# Patient Record
Sex: FEMALE
[De-identification: names, ages, dates, MRNs, and addresses within clinical notes are randomized per-mention and may not be internally consistent; named-entity substitution may affect disease eponyms.]

---

## 2017-09-22 ENCOUNTER — HOSPITAL ENCOUNTER (OUTPATIENT)
Dept: HOSPITAL 80 - FSGY | Age: 77
Discharge: HOME | End: 2017-09-22
Attending: INTERNAL MEDICINE
Payer: COMMERCIAL

## 2017-09-22 VITALS — TEMPERATURE: 98.1 F

## 2017-09-22 VITALS — OXYGEN SATURATION: 96 %

## 2017-09-22 VITALS — RESPIRATION RATE: 14 BRPM | DIASTOLIC BLOOD PRESSURE: 84 MMHG | HEART RATE: 77 BPM | SYSTOLIC BLOOD PRESSURE: 140 MMHG

## 2017-09-22 DIAGNOSIS — Z88.2: ICD-10-CM

## 2017-09-22 DIAGNOSIS — M35.2: ICD-10-CM

## 2017-09-22 DIAGNOSIS — K29.70: ICD-10-CM

## 2017-09-22 DIAGNOSIS — R13.10: Primary | ICD-10-CM

## 2017-09-22 DIAGNOSIS — G25.81: ICD-10-CM

## 2017-09-22 DIAGNOSIS — F32.9: ICD-10-CM

## 2017-09-22 DIAGNOSIS — E78.5: ICD-10-CM

## 2017-09-22 DIAGNOSIS — E11.9: ICD-10-CM

## 2017-09-22 DIAGNOSIS — J44.9: ICD-10-CM

## 2017-09-22 DIAGNOSIS — K21.9: ICD-10-CM

## 2017-09-22 DIAGNOSIS — F41.9: ICD-10-CM

## 2017-09-22 PROCEDURE — C1726 CATH, BAL DIL, NON-VASCULAR: HCPCS

## 2017-09-22 PROCEDURE — 43239 EGD BIOPSY SINGLE/MULTIPLE: CPT

## 2017-09-22 PROCEDURE — 43249 ESOPH EGD DILATION <30 MM: CPT

## 2017-09-22 PROCEDURE — 43248 EGD GUIDE WIRE INSERTION: CPT

## 2017-09-22 PROCEDURE — 93005 ELECTROCARDIOGRAM TRACING: CPT

## 2017-09-22 NOTE — PDANEPAE
ANE Past Medical History





- Cardiovascular History


Hx Hypertension: Yes


Hx Arrhythmias: Yes


Hx Chest Pain: No


Hx Coronary Artery / Peripheral Vascular Disease: Yes


Hx CHF / Valvular Disease: Yes


Hx Palpitations: Yes


Cardiovascular History Comment: HYPERLIPIDEMIA





- Pulmonary History


Hx COPD: Yes


Hx Asthma/Reactive Airway Disease: Yes


Hx Recent Upper Respiratory Infection: No


Hx Oxygen in Use at Home: Yes


O2 in Use at Home (L/minute): O2 2L CONT.


Hx Sleep Apnea: No


Sleep Apnea Screening Result - Last Documented: Negative





- Neurologic History


Hx Cerebrovascular Accident: Yes


Neurologic History Comment: RESTLESS LEG SYNDROME





- Endocrine History


Hx Diabetes: Yes


Endocrine History Comment: DM II





- Neurological & Psychiatric Hx


Hx Neurological and Psychiatric Disorders: Yes


Neurological / Psychiatric History Comment: DEPRESSIVE DISORDER.  ANXIETY





- GI History


Hx Gastrointestinal Disorders: Yes


Gastrointestinal History Comment: GERD.  CONSTIPATION





- Other Health History


Other Health History: BEHCET'S DISEASE.  OSTEOARTHRITIS.  GLOSSITIS





ANE Review of Systems


Review of Systems: 








- Exercise capacity


METS (RN): 3 METS





ANE Patient History





- Allergies


Allergies/Adverse Reactions: 








adhesive tape Allergy (Verified 09/21/17 18:40)


 


Sulfa (Sulfonamide Antibiotics) Allergy (Verified 09/21/17 18:39)


 


warfarin [From Coumadin] Allergy (Verified 09/21/17 18:39)


 








- Home Medications


Home Medications: 








Aspirin  09/21/17 [Last Taken Unknown]


Ativan  09/21/17 [Last Taken Unknown]


Celexa  09/21/17 [Last Taken Unknown]


Colchicine  09/21/17 [Last Taken Unknown]


Flonase Nasal Spray  09/21/17 [Last Taken Unknown]


Herbals/Supplements -Info Only  09/21/17 [Last Taken Unknown]


Lipitor  09/21/17 [Last Taken Unknown]


Metformin HCl  09/21/17 [Last Taken Unknown]


Mucinex  09/21/17 [Last Taken Unknown]


Norco 5/325 (*)  09/21/17 [Last Taken Unknown]


Nystatin  09/21/17 [Last Taken Unknown]


Pantoprazole Sodium  09/21/17 [Last Taken Unknown]


Requip  09/21/17 [Last Taken Unknown]


Senna  09/21/17 [Last Taken Unknown]


Spiriva Inhaler (RX)  09/21/17 [Last Taken Unknown]


Symbicort 80-4.5 Mcg Inhaler  09/21/17 [Last Taken Unknown]


Triamcinolone 0.025%  09/21/17 [Last Taken Unknown]








- NPO status


NPO Since - Liquids (Date): 09/21/17


NPO Since - Liquids (Time): 19:30


NPO Since - Solids (Date): 09/21/17


NPO Since - Solids (Time): 19:30





- Smoking Hx


Smoking Status: Never smoked





ANE Labs/Vital Signs





- Vital Signs


Blood Pressure: 144/89


Heart Rate: 88


Respiratory Rate: 16


O2 Sat (%): 96


Height: 167.64 cm


Weight: 79.832 kg





ANE Physical Exam





- Airway


Neck exam: FROM


Mallampati Score: Class 2


Mouth exam: dentures





- Pulmonary


Pulmonary: reduced air movement, expiratory wheeze, other (chronic respiratory 

insufficency, end stage COPD)





- Cardiovascular


Cardiovascular: regular rate and rhythym, no murmur, rub, or gallop





- ASA Status


ASA Status: IV





ANE Anesthesia Plan


Anesthesia Plan: MAC

## 2017-09-22 NOTE — CPEKG
Heart Rate: 84

RR Interval: 714

P-R Interval: 116

QRSD Interval: 78

QT Interval: 392

QTC Interval: 464

P Axis: -3

QRS Axis: -65

T Wave Axis: 46

EKG Severity - ABNORMAL ECG -

EKG Impression: SINUS RHYTHM

EKG Impression: LEFT ANTERIOR FASCICULAR BLOCK

EKG Impression: CONSIDER RVH W/ SECONDARY REPOL ABNORMALITY

EKG Impression: ANTERIOR T WAVE ABNORMALITIES - CANNOT RULE OUT ISCHEMIA

Electronically Signed By: Solange Rodrigues 22-Sep-2017 11:54:13

## 2017-09-22 NOTE — PDGENHP
History & Physical


Chief Complaint: DYSPHAGIA


History of Present Illness: DYSPHAGIA


Pertinent Past, Social, Family History: no tobacco or alcohol.  fhx no colon 

cance.  COPD need for oxygen


Relevant Physical Exam: a+ox3.  cta.  s1s2.  +Bs, soft nt


Cardiorespiratory Assessment: class 4

## 2017-09-22 NOTE — POSTANESTH
Post Anesthetic Evaluation


Cardiovascular Status: Normal, Stable, Similar to Pre-Op Cond


Respiratory Status: Normal, Stable, Similar to Pre-op Cond.


Level of Consciousness/Mental Status: Can Participate in Eval


Pain Control: Adequate, Prn Tx Ordered


Nausea/Vomiting Control: Adequate, Prn Tx Ordered


Complications Possibly Related to Anesthesia: None Noted

## 2017-09-22 NOTE — GIREPORT
[facility]

Surgical Services - Endoscopy Department

_____________________________________________________________________________________________________
_______

Patient Name: Brinda Bradley                          Procedure Date: 9/22/2017 9:36 AM

MRN: [id number]                                       Account Number: [id number]

Patient Type: Outpatient                             Attending  MD/ ER Physician: STEPHANIE Mart

_____________________________________________________________________________________________________
_______

 

Procedure:                    Upper GI endoscopy

Indications:                  Dysphagia

Providers:                    Tha Gusman MD

Medicines:                    Sedation Required Anesthesia Staff Assistance, IV general

Complications:                No immediate complications. Estimated blood loss: Minimal.

_____________________________________________________________________________________________________
_______

Description of Procedure:     After obtaining informed consent, the endoscope was passed under direct
 

                              vision. Throughout the procedure, the patient's blood pressure, pulse, 
and 

                              oxygen saturations were monitored continuously. The Endoscope was intro
duced 

                              through the mouth, and advanced to the second part of duodenum. The [facility]
er GI 

                              endoscopy was accomplished without difficulty. The patient tolerated th
e 

                              procedure well.

                                                                                                     
       

Findings:

     The gastroesophageal junction was normal. A TTS dilator was passed through the scope. Dilation w
ith 

     an 18-19-20 mm balloon dilator was performed to 20 mm. The dilation site was examined and showed
 no 

     change. Estimated blood loss: none.

     The examined esophagus was normal. A guidewire was placed and the scope was withdrawn. Dilation 
was 

     performed with a Savary dilator with no resistance at 60 Fr. Estimated blood loss: none.

     Scattered moderate inflammation characterized by erythema and granularity was found in the gastr
ic 

     antrum. Biopsies were taken with a cold forceps for histology. Estimated blood loss was minimal.


     The examined duodenum was normal.

     The exam was otherwise without abnormality.

                                                                                                     
       

Estimated Blood Loss:

     Estimated blood loss was minimal.

                                                                                                     
       

Post Op Diagnosis:

     - Normal gastroesophageal junction. Dilated. No visible effect. Therefore passed the 60 Kh[place] 

     Savary, also with no visible effect.

     - Normal esophagus. Dilated.

     - Gastritis. Biopsied.

     - Normal examined duodenum.

     - The examination was otherwise normal.

                                                                                                     
       

Recommendation:

     - Await pathology results.

     - My office will call with the pathology result with 5-7 days. If you have not heard from my off
ice 

     by 12-14, do not assume the pathology is normal, please call [phone] to get the pathology r
eults.

     - Follow an antireflux regimen.

     - Use Protonix (pantoprazole) 40 mg PO daily. Take 30-60 minutes before breakfast

     - If dysphagia conitnues consider an ambulatory esophageal manometry at appointment to be schedu
led.

     - Discharge patient to home (ambulatory).

     - Return to GI clinic PRN.

     - Return to primary care physician as previously scheduled.

     - Thank you for allowing me to help in your patient's care. Do not hesitate to call with any 

     questions.

                                                                                                     
       

Attending Participation:

     I personally performed the entire procedure.

                                                                                                     
       

 

Uzma Almazan M.D

___________________

Tha Gusman MD

9/22/2017 10:35:45 AM

Number of Addenda: 0

 

Note Initiated On: 9/22/2017 9:36 AM

Total Procedure Duration Time 0 hours 9 minutes 54 seconds 

[email or web address]

## 2018-05-03 ENCOUNTER — HOSPITAL ENCOUNTER (OUTPATIENT)
Dept: HOSPITAL 80 - FIMAGING | Age: 78
End: 2018-05-03
Attending: GENERAL ACUTE CARE HOSPITAL
Payer: COMMERCIAL

## 2018-05-03 DIAGNOSIS — R91.1: Primary | ICD-10-CM

## 2018-05-03 DIAGNOSIS — K44.9: ICD-10-CM

## 2018-05-03 PROCEDURE — 74160 CT ABDOMEN W/CONTRAST: CPT

## 2019-03-15 ENCOUNTER — HOSPITAL ENCOUNTER (OUTPATIENT)
Dept: HOSPITAL 80 - FSGY | Age: 79
Discharge: HOME | End: 2019-03-15
Attending: INTERNAL MEDICINE
Payer: COMMERCIAL

## 2019-03-15 VITALS — DIASTOLIC BLOOD PRESSURE: 71 MMHG | SYSTOLIC BLOOD PRESSURE: 121 MMHG

## 2019-03-15 DIAGNOSIS — K44.9: ICD-10-CM

## 2019-03-15 DIAGNOSIS — E78.5: ICD-10-CM

## 2019-03-15 DIAGNOSIS — R13.10: Primary | ICD-10-CM

## 2019-03-15 DIAGNOSIS — K21.9: ICD-10-CM

## 2019-03-15 DIAGNOSIS — E11.9: ICD-10-CM

## 2019-03-15 DIAGNOSIS — F41.9: ICD-10-CM

## 2019-03-15 DIAGNOSIS — F32.9: ICD-10-CM

## 2019-03-15 DIAGNOSIS — G25.81: ICD-10-CM

## 2019-03-15 NOTE — POSTANESTH
Post Anesthetic Evaluation


Cardiovascular Status: Normal, Stable, Similar to Pre-Op Cond


Respiratory Status: Normal, Stable, Similar to Pre-op Cond.


Level of Consciousness/Mental Status: Moderately Sleepy


Pain Control: Adequate, Prn Tx Ordered


Nausea/Vomiting Control: Adequate, Prn Tx Ordered


Complications Possibly Related to Anesthesia: None Noted

## 2019-03-15 NOTE — GIREPORT
[facility]

Surgical Services - Endoscopy Department

_____________________________________________________________________________________________________
_______

Patient Name: Brinda Bradley                          Procedure Date: 3/15/2019 9:47 AM

MRN: [id number]                                       Account Number: [id number]

Patient Type: Outpatient                             Attending  MD/ ER Physician: Robbi Gusman MD

_____________________________________________________________________________________________________
_______

 

Procedure:                    Upper GI endoscopy

Indications:                  Dysphagia

Providers:                    Robbi Gusman MD

Referring MD:                 Awais Steiner MD

Medicines:                    Propofol per Anesthesia = IV general with spont resps

Complications:                No immediate complications. Estimated blood loss: Minimal.

Description of Procedure:     After obtaining informed consent, the endoscope was passed under direct
 

                              vision. Throughout the procedure, the patient's blood pressure, pulse, 
and 

                              oxygen saturations were monitored continuously. The Endoscope was intro
duced 

                              through the mouth, and advanced to the third part of duodenum. The uppe
r GI 

                              endoscopy was accomplished without difficulty. The patient tolerated th
e 

                              procedure well.

Findings:                     The examined esophagus was normal. Biopsies were obtained from the prox
imal 

                              and distal esophagus with cold forceps for histology of suspected 

                              eosinophilic esophagitis. Estimated blood loss was minimal. A guidewire
 was 

                              placed and the scope was withdrawn. Dilation was performed with a Savar
y 

                              dilator with no resistance at 60 Fr. The dilation site was examined 

                              following endoscope reinsertion and showed no change. Estimated blood l
oss: 

                              none.

                              A small hiatal hernia was present.

                              The examined duodenum was normal.

                              The exam was otherwise without abnormality.

Estimated Blood Loss:         Estimated blood loss was minimal.

Post Op Diagnosis:            - Normal esophagus. Biopsied. Dilated with NO dilation noted

                              - Small hiatal hernia.

                              - Normal examined duodenum.

                              - The examination was otherwise normal.

Recommendation:               - Await pathology results.

                              - My office will call with the pathology result with 5-7 days. If you h
ave 

                              not heard from my office by 12-14, do not assume the pathology is sharee
l, 

                              please call [phone] to get the pathology results.

                              - Follow an antireflux regimen.

                              - Continue present medications.

                              - Perform routine esophageal manometry at appointment to be scheduled.

                              - Discharge patient to home (ambulatory).

                              - Return to GI clinic after studies are complete.

                              - Return to primary care physician as previously scheduled.

                              - Thank you for allowing me to help in your patient's care. Do not hesi
herrera 

                              to call with any questions.

Attending Participation:

     I personally performed the entire procedure.

 

Uzma Almazan M.D

____________________

Robbi Gusman MD

3/15/2019 10:38:03 AM

This report has been signed electronicallyMatthew MD Uzma

Number of Addenda: 0

 

Note Initiated On: 3/15/2019 9:47 AM

[email or web address]}

## 2019-03-15 NOTE — PDANEPAE
ANE History of Present Illness


dysphagia here for EGD








ANE Past Medical History





- Cardiovascular History


Hx Hypertension: No


Hx Arrhythmias: No


Hx Chest Pain: No


Hx Coronary Artery / Peripheral Vascular Disease: No


Hx CHF / Valvular Disease: No


Hx Palpitations: No


Cardiovascular History Comment: HYPERLIPIDEMIA, hx of angina





- Pulmonary History


Hx COPD: Yes


Hx Asthma/Reactive Airway Disease: No


Hx Recent Upper Respiratory Infection: No


Hx Oxygen in Use at Home: Yes


O2 in Use at Home (L/minute): 2.L 24/7


Hx Sleep Apnea: No


Sleep Apnea Screening Result - Last Documented: Negative





- Neurologic History


Hx Cerebrovascular Accident: No


Hx Seizures: No


Neurologic History Comment: RESTLESS LEG SYNDROME





- Endocrine History


Hx Diabetes: Yes


Endocrine History Comment: DM II





- Renal History


Hx Renal Disorders: No





- Liver History


Hx Hepatic Disorders: No





- Neurological & Psychiatric Hx


Hx Neurological and Psychiatric Disorders: Yes


Neurological / Psychiatric History Comment: DEPRESSIVE DISORDER.  ANXIETY





- Cancer History


Hx Cancer: No





- Congenital Disorder History


Hx Congenital Disorders: No





- GI History


Hx Gastrointestinal Disorders: Yes


Gastrointestinal History Comment: GERD.  CONSTIPATION





- Other Health History


Other Health History: BEHCET'S DISEASE.  OSTEOARTHRITIS.  GLOSSITIS





- Chronic Pain History


Chronic Pain: Yes (abdominal)





- Surgical History


Prior Surgeries: none





ANE Review of Systems


Review of Systems: 








- Exercise capacity


METS (RN): 3 METS





ANE Patient History





- Allergies


Allergies/Adverse Reactions: 








adhesive tape Allergy (Verified 03/01/19 11:35)


 


Sulfa (Sulfonamide Antibiotics) Allergy (Verified 03/01/19 11:35)


 


warfarin [From Coumadin] Allergy (Verified 03/01/19 11:35)


 








- Home Medications


Home Medications: 








Aspirin  09/21/17 [Last Taken Unknown]


Ativan  09/21/17 [Last Taken Unknown]


Celexa  09/21/17 [Last Taken Unknown]


Colchicine  09/21/17 [Last Taken Unknown]


Flonase Nasal Spray  09/21/17 [Last Taken Unknown]


Herbals/Supplements -Info Only  09/21/17 [Last Taken Unknown]


Lipitor  09/21/17 [Last Taken Unknown]


Metformin HCl  09/21/17 [Last Taken Unknown]


Mucinex  09/21/17 [Last Taken Unknown]


Norco 5/325 (*)  09/21/17 [Last Taken Unknown]


Nystatin  09/21/17 [Last Taken Unknown]


Pantoprazole Sodium  09/21/17 [Last Taken Unknown]


Requip  09/21/17 [Last Taken Unknown]


Senna  09/21/17 [Last Taken Unknown]


Spiriva Inhaler (RX)  09/21/17 [Last Taken Unknown]


Symbicort 80-4.5 Mcg Inhaler  09/21/17 [Last Taken Unknown]


Triamcinolone 0.025%  09/21/17 [Last Taken Unknown]








- NPO status


NPO Status: no food or drink >8 hours





- Anes Hx


Anes Hx: no prior problems





- Smoking Hx


Smoking Status: Never smoked





- Alcohol Use


Alcohol Use: None





- Family Anes Hx


Family Anes Hx: none


Family Hx Anesthesia Complications: unknown





ANE Labs/Vital Signs





- Vital Signs


Height: 167.64 cm


Weight: 82.645 kg





ANE Physical Exam





- Airway


Neck exam: FROM


Mallampati Score: Class 2


Mouth exam: dentures





- Pulmonary


Pulmonary: no respiratory distress





- Cardiovascular


Cardiovascular: regular rate and rhythym





- ASA Status


ASA Status: III





ANE Anesthesia Plan


Anesthesia Plan: GA with mask


Total IV Anesthesia: Yes

## 2019-03-15 NOTE — PDGENHP
History & Physical


Chief Complaint: dysphagia


History of Present Illness: dysphagia.  gerd.  hb


Pertinent Past, Social, Family History: no tobaco quit 9 years.  alcohol - very 

rare.  FHx - no cancer gi tract.  copd, dm


Relevant Physical Exam: A+Ox3.  CTA.  S1S2.  +BS, soft epi tenderness


Cardiorespiratory Assessment: class 3

## 2019-06-06 ENCOUNTER — HOSPITAL ENCOUNTER (INPATIENT)
Dept: HOSPITAL 80 - FEDHOLD | Age: 79
LOS: 6 days | Discharge: NURSING, REHAB OR LONG-TERM CARE | End: 2019-06-12
Payer: COMMERCIAL